# Patient Record
(demographics unavailable — no encounter records)

---

## 2025-04-10 NOTE — PHYSICAL EXAM
[Normal Appearance] : normal appearance [Well Groomed] : well groomed [General Appearance - In No Acute Distress] : no acute distress [Edema] : no peripheral edema [Respiration, Rhythm And Depth] : normal respiratory rhythm and effort [Exaggerated Use Of Accessory Muscles For Inspiration] : no accessory muscle use [Abdomen Soft] : soft [Abdomen Tenderness] : non-tender [Costovertebral Angle Tenderness] : no ~M costovertebral angle tenderness [Urinary Bladder Findings] : the bladder was normal on palpation [Normal Station and Gait] : the gait and station were normal for the patient's age [] : no rash [No Focal Deficits] : no focal deficits [Oriented To Time, Place, And Person] : oriented to person, place, and time [Affect] : the affect was normal [Mood] : the mood was normal [No Palpable Adenopathy] : no palpable adenopathy [Chaperone Present] : A chaperone was present in the examining room during all aspects of the physical examination [FreeTextEntry2] :  Poli Quiñones

## 2025-04-10 NOTE — LETTER BODY
[Dear  ___] : Dear  [unfilled], [Consult Letter:] : I had the pleasure of evaluating your patient, [unfilled]. [Please see my note below.] : Please see my note below. [Consult Closing:] : Thank you very much for allowing me to participate in the care of this patient.  If you have any questions, please do not hesitate to contact me. [Sincerely,] : Sincerely, [FreeTextEntry3] : Mono Maldonado MD

## 2025-04-10 NOTE — END OF VISIT
[Time Spent: ___ minutes] : I have spent [unfilled] minutes of time on the encounter which excludes teaching and separately reported services. [FreeTextEntry4] :  This note was written by Poli Quiñones on 04/10/2025 actively solely Mono Tomlin M.D. I, Poli Quiñones, am scribing for and in the presence of Mono Tomlin M.D. in the following sections HISTORY OF PRESENT ILLNESS, PAST MEDICAL/FAMILY/SOCIAL HISTORY; REVIEW OF SYSTEMS; VITAL SIGNS; PHYSICAL EXAM; ASSESSMENT/PLAN.     All medical record entries made by this scribe at my, Mono Tomlin M.D. direction and personally dictated by me on 04/10/2025. I personally performed the services described in the documentation, reviewed the documentation recorded by the scribe in my presence, and it accurately and completely records my words and actions.

## 2025-04-10 NOTE — HISTORY OF PRESENT ILLNESS
[FreeTextEntry1] : Gross, total painless hematuria for 5 days , treated with Cipro. urine culture : negative.On Plavix Renal sonogram was requested. Not done.  06/09/2021: Mr. RESTREPO is a 71 year male who presents today for a follow up for gross hematuria. Onset was 2 days ago. Pt presented to PMD, renal sonogram was done in PMD's office, pt reports was  normal. Pt was prescribed Cipro. N x 1. Pt notes urgency since onset of gross hematuria.  Pt is s/p TURP 05/2014. Pt is currently taking Plavix secondary to triple bypass on 06/2004. He denies dysuria and hesitancy.   Will continue Cipro for 10 days and D/C Plavix for one week. Pt will follow up with Cardiologist  Will get UA and culture, cytology Follow up in one week for cystoscopy possible biopsy and fulguration, check UA, culture, uroflow and bladder scan  06/22/2021: Mr. RESTREPO is a 71 year male who presents today for a follow up for gross hematuria. He is doing well. Hematuria has resolved. He denies hematuria, dysuria, urgency and hesitancy.   Labs on 06/09/2021: UA: dark red, slightly turbid, nitrite positive, hematuria, proteinuria, small bilirubin; Cytology and culture: negative Uroflow: good stream ; PVR 0 cc  Cystoscopy was done today. Polypoids lesions in prostatic urethra, most likely inflammatory. S/P TURP in the past. No bladder polyps, mass, stones of tumors were visualized.  Hematuria secondary to prostate inflammation. Will start on Finasteride.  Will get UA and culture Follow up in one month to r/o infection, uroflow and bladder scan  08/31/2021: Mr. RESTREPO is a 71 year male who presents today for a follow up Gross hematuria. He is doing well. N x 2. He denies hematuria, dysuria, urgency and hesitancy.   Labs on 06/22/2021: UA: proteinuria; Culture: negative Uroflow: good stream ; PVR 8 cc  On Finasteride; will continue  Will get UA and Culture  Follow up in 3 months to check UA, culture, Uroflow, bladder scan and draw PSA   11/18/2021: S/P TURP for enlarged prostate. Gross hematuria, treated with Finasteride. No hematuria, voiding well. Uroflow and bladder scan : good. Will continue Finasteride. Check PSA . RTO 3 months.  03/17/2022: Hematuria bleeding from prostate controlled with Finasteride. Renewed. PVR 17 ml Bilateral epididymal cysts. on sonogram. No intervention RTC 3 months  06/14/2022: Mr. RESTREPO is a 72 year male who presents today for a follow up for  hematuria, secondary to prostate inflammation secondary to TURP.  Managed with  Finasteride. No more hematuria. Will continue Finasteride Patient is voiding and emptying bladder and Patient is voiding well. N x0   PSA will be checked every 6 months.   Epididymal cyst:  stable and no intervention is needed    Advised pt. to concentrate on diabetes and heart condition .   RTC: 6 months for Follow up: PSA, UA, culture, uroflow and bladder scan     10/12/2022: Mr. RESTREPO is a 72 year male who presents today for incidental finding of bacteriuria. Treated with Cipro for 3 days. Asymptomatic. Pt had E coli bacteriuria earlier with out symptoms. no need for antibiotics for asymptomatic bacteriuria.. PVR 7 ml. PSA normal, 0.44 ng/mL 09/13/2022 Finasteride renewed. will reevaluate in 6 months. .      04/12/2023: Mr. RESTREPO is a 72 year male who presents today for a follow up for Enlarged prostate with LUTS, S/P TURP. Gross Hematuria bleeding from prostata on Finasteride. No bleeding..   Enlarged prostate with LUTS : On Finasteride. He denies hematuria, dysuria, urgency and hesitancy. Urinary stream is good. Patient is voiding and emptying bladder well. PVR: 11 cc. Will continue Finasteride.   PSA:  09/17/2019: 1.07 ng/mL 11/18/2021: 0.78 ng/mL 03/17/2022: 0.61 ng/mL  Drawn today   Urinalysis and Culture today    RTC: 1 year : Follow up: PSA, UA, culture, uroflow and bladder scan      04/11/2024: s/p TURP. voiding well. Developed hematuria started on Finasteride. No more hematuria.  Uroflow: good and PVR: 24 ml.   PSA:  09/17/2019: 1.07 ng/mL 11/18/2021: 0.78 ng/mL 03/17/2022: 0.61 ng/mL  04/10/2023: 0.51 ng/ml Drawn today   NO change in the size of left epididymal cyst.   Will continue Finasteride  PSA drawn today. RTC: 1 year.    04/10/2025, 75 y/o presents with a follow up visit for BPH w/ LUTS S/p TURP 05/2014.   BPH:  Patient is taking Finasteride 5 MG. Doing well. Reports occasional Frequency sec to increased water intake and Jardiance. Patient denies any difficulty passing urine, urgency, dysuria or hematuria.   Uroflow  	Maximum Flow 7.0	 	 	  	Average Flow	3.3	 	 	  	Voiding Time	47.8	 	 	  	Flow Time	45.2	 	 	  	Time to Max Flow	6.8	 	 	  	Voided Volume  147 ml  PVR 230cc Previously 24cc   Residual urine most likely secondary recent increased fluid intake. Patient is doing well. Will continue Finasteride.   PSA:  09/17/2019: 1.07 ng/mL 11/18/2021: 0.78 ng/mL 03/17/2022: 0.61 ng/mL  04/10/2023: 0.51 ng/ml 03/03/2025: 0.31 ng/mL PSA Stable   UA and Culture Taken   Of Note: Pilonidal Cyst according to CT 10/23/24  RTC 1 year follow up for visit PSA, Uroflow, PVR, and UA and Culture

## 2025-04-10 NOTE — REVIEW OF SYSTEMS
[see HPI] : see HPI [Nocturia] : nocturia [No erections] : no erections [Seen by urologist before (Name)  ___] : Preciously seen by a urologist: [unfilled] [Wake up at night to urinate  How many times?  ___] : wakes up to urinate [unfilled] times during the night [Easy Bleeding] : a tendency for easy bleeding [Easy Bruising] : a tendency for easy bruising [Negative] : Heme/Lymph [Dysuria] : no dysuria [Incontinence] : no incontinence [Hesitancy] : no urinary hesitancy [Genital Lesion] : no genital lesions [Testicular Pain] : no testicular pain [FreeTextEntry2] : htn